# Patient Record
Sex: MALE | Race: BLACK OR AFRICAN AMERICAN | NOT HISPANIC OR LATINO | Employment: OTHER | URBAN - METROPOLITAN AREA
[De-identification: names, ages, dates, MRNs, and addresses within clinical notes are randomized per-mention and may not be internally consistent; named-entity substitution may affect disease eponyms.]

---

## 2020-08-12 PROBLEM — Z12.12 ENCOUNTER FOR COLORECTAL CANCER SCREENING: Status: ACTIVE | Noted: 2020-08-12

## 2020-08-12 PROBLEM — Z12.11 ENCOUNTER FOR COLORECTAL CANCER SCREENING: Status: ACTIVE | Noted: 2020-08-12

## 2020-08-12 PROBLEM — K92.1 BLOOD IN STOOL: Status: ACTIVE | Noted: 2020-08-12

## 2020-08-12 PROBLEM — R19.7 DIARRHEA: Status: ACTIVE | Noted: 2020-08-12

## 2021-05-07 PROBLEM — K57.90 DIVERTICULOSIS: Status: ACTIVE | Noted: 2021-05-07

## 2021-05-07 PROBLEM — R19.7 DIARRHEA: Status: RESOLVED | Noted: 2020-08-12 | Resolved: 2021-05-07

## 2023-09-07 ENCOUNTER — SOCIAL WORK (OUTPATIENT)
Dept: ADMINISTRATIVE | Facility: OTHER | Age: 63
End: 2023-09-07

## 2023-09-07 NOTE — PROGRESS NOTES
SREEDHAR received consult from  regarding physical therapy. SREEDHAR will further assist upon completion and signature for office visit.    INDIRA Foss  Desk:[phone]  Fax:[phone]

## 2023-09-08 ENCOUNTER — SOCIAL WORK (OUTPATIENT)
Dept: ADMINISTRATIVE | Facility: OTHER | Age: 63
End: 2023-09-08

## 2023-09-08 NOTE — PROGRESS NOTES
Late Entry SW made phone call to pt([phone])regarding a referral for physical therapy unable to reach or leave a message. SREEDHAR will continue to follow up.    INDIRA Foss  Desk:[phone]  Fax:[phone]

## 2023-09-11 ENCOUNTER — SOCIAL WORK (OUTPATIENT)
Dept: ADMINISTRATIVE | Facility: OTHER | Age: 63
End: 2023-09-11

## 2023-09-11 NOTE — PROGRESS NOTES
SW received phone call from pt([phone]);per pt was returning call. SW informal pt of the referral for outpatient physical therapy from the MD. SW provide pt with options for outpatient physical therapy in the area. Pt informal SW would like a referral made to [facility]. SW made phone call to to [facility]([phone])informed intake was going to send over a referral for physical therapy. SW fax pt medical information to([phone])for review. SREEDHAR will continue to follow up.    INDIRA Foss  Pager#[phone]

## 2023-09-13 ENCOUNTER — SOCIAL WORK (OUTPATIENT)
Dept: ADMINISTRATIVE | Facility: OTHER | Age: 63
End: 2023-09-13
